# Patient Record
Sex: FEMALE | Race: BLACK OR AFRICAN AMERICAN | NOT HISPANIC OR LATINO | Employment: FULL TIME | URBAN - METROPOLITAN AREA
[De-identification: names, ages, dates, MRNs, and addresses within clinical notes are randomized per-mention and may not be internally consistent; named-entity substitution may affect disease eponyms.]

---

## 2023-08-30 ENCOUNTER — APPOINTMENT (OUTPATIENT)
Dept: PRIMARY CARE | Facility: CLINIC | Age: 33
End: 2023-08-30
Payer: COMMERCIAL

## 2023-09-17 PROBLEM — M54.50 LOW BACK PAIN: Status: ACTIVE | Noted: 2023-09-17

## 2023-09-17 PROBLEM — M25.552 HIP PAIN, LEFT: Status: ACTIVE | Noted: 2023-09-17

## 2023-09-17 PROBLEM — A74.9 CHLAMYDIA INFECTION, CURRENT PREGNANCY (HHS-HCC): Status: ACTIVE | Noted: 2023-09-17

## 2023-09-17 PROBLEM — O99.019 ANEMIA IN PREGNANCY (HHS-HCC): Status: ACTIVE | Noted: 2023-09-17

## 2023-09-17 PROBLEM — O98.819 CHLAMYDIA INFECTION, CURRENT PREGNANCY (HHS-HCC): Status: ACTIVE | Noted: 2023-09-17

## 2023-09-17 RX ORDER — METRONIDAZOLE 500 MG/1
500 TABLET ORAL
COMMUNITY
Start: 2014-12-17

## 2023-09-17 RX ORDER — VITAMIN A ACETATE, BETA CAROTENE, ASCORBIC ACID, CHOLECALCIFEROL, .ALPHA.-TOCOPHEROL ACETATE, DL-, THIAMINE MONONITRATE, RIBOFLAVIN, NIACINAMIDE, PYRIDOXINE HYDROCHLORIDE, FOLIC ACID, CYANOCOBALAMIN, CALCIUM CARBONATE, FERROUS FUMARATE, ZINC OXIDE, CUPRIC OXIDE 3080; 12; 120; 400; 1; 1.84; 3; 20; 22; 920; 25; 200; 27; 10; 2 [IU]/1; UG/1; MG/1; [IU]/1; MG/1; MG/1; MG/1; MG/1; MG/1; [IU]/1; MG/1; MG/1; MG/1; MG/1; MG/1
1 TABLET, FILM COATED ORAL DAILY
COMMUNITY
Start: 2014-09-09

## 2023-09-17 RX ORDER — FERROUS SULFATE 325(65) MG
1 TABLET ORAL
COMMUNITY
Start: 2015-01-04

## 2023-10-18 ENCOUNTER — APPOINTMENT (OUTPATIENT)
Dept: SLEEP MEDICINE | Facility: CLINIC | Age: 33
End: 2023-10-18
Payer: COMMERCIAL

## 2023-10-31 NOTE — PROGRESS NOTES
"   Patient: Alan Hilario    [id number]  : 1990 -- AGE 32 y.o.    Provider: Niyah Mclaughlin MD PhD     Location [facility]   Service Date: 10/30/2023              [facility]  New Visit Note      HISTORY OF PRESENT ILLNESS     The patient's referring provider is: Rosie Ashby MD     REASON FOR VISIT: No chief complaint on file.    HISTORY OF PRESENT ILLNESS   Ms. Rafy Hilario is a 32 y.o. female with hx of anemia In pregnancy, HTN, PCOS, anxiety who presents to a [facility] for a sleep medicine evaluation with concerns of snoring.      CURRENT HISTORY  On today's visit, the patient reports ***    Over the past five years, the patient's weight has  {Increased/decreased/unchanged:25190} by *** lbs.     Sleep schedule:  In bed: ***  Activities in bed:   {Bedtime Activities:91208}  Subjective sleep latency:  ***  Awakenings during night: ***  Length of awakenings: ***  Final awakening time: 7***  Out of bed: ***  Overall estimate of total sleep time: ***    Weekends: ***  Preferred sleeping position: {SLEEP POSITION:12297}  Typically sleeps {Blank single:59252::\"alone\",\"with a partner\",\"with her \",\"with his wife\"}.       Associated sleep symptoms:  Breathing during sleep: {Breathing during sleep:57511}  Behaviors at night: {Yes/No:91924}  Sleep paralysis: {Yes/No:95146}  Hypnogogic or hypnopompic hallucinations: {Yes/No:45186}  Cataplexy: {Yes/No:83696}    Sleep Initiation:{Sleep initiation:52943}  Problems going to sleep associated with: {Prob Falling Asleep:36535}    Sleep Maintenance: {Sleep Maintenance:84160}  Problems staying asleep associated with: {Problems Staying Asleep:43188}    Leg symptoms and timing:  {RLSSCREEN:17903}    Sleep Review of Systems: She denied symptoms that suggest narcolepsy (e.g., hypnogogic or hypnopompic hallucinations, cataplexy, or sleep paralysis) or restless legs syndrome (i.e.., " "paresthesia like sensations at rest that are relieved with movement, and circadian variation to these symptoms). She also denied parasomnia like behaviors such as somnambulism (sleep walking), sleep talking, acting out of dreams, nightmares, and teeth grinding.      Sleep environment:  Pets in bed :   {YES (DEF)/NO:63098}   Bedroom temperature: {BEDROOM TEMP:74776}  Noise :   {YES (DEF)/NO:59294}   Issues with bed comfort :   {YES (DEF)/NO:90626}       Daytime Symptoms:  On awakening patient reports: {Morning Symptoms:86047}    Daytime:       Driving History:     ESS: No data recorded   TWAN: ***  FOSQ:  ***      REVIEW OF SYSTEMS     REVIEW OF SYSTEMS  Review of Systems  {SLEEP ROS:48492:::1}      ALLERGIES AND MEDICATIONS     ALLERGIES  No Known Allergies    MEDICATIONS  Current Outpatient Medications   Medication Sig Dispense Refill    ferrous sulfate 325 (65 Fe) MG tablet Take 1 tablet (325 mg) by mouth 2 times a day with meals.      metroNIDAZOLE (Flagyl) 500 mg tablet Take 1 tablet (500 mg) by mouth.      prenatal vitamin calcium-iron-folic (Prenatal Plus, calcium carb,) 27 mg iron- 1 mg tablet Take 1 tablet by mouth once daily.       No current facility-administered medications for this visit.         PAST HISTORY     PAST MEDICAL HISTORY  She  has no past medical history on file.  ***     PAST SURGICAL HISTORY:  No past surgical history on file.    FAMILY HISTORY  No family history on file.  {{DOES/DOES NOT EC:11030} have a family history of sleep disorder (e.g., sleep apnea, narcolepsy in any first degree relatives).      SOCIAL HISTORY  She  has no history on file for tobacco use, alcohol use, and drug use. She currently lives {Leeann single:19197::\"alone\",\"with a partner\",\"with her \",\"with his wife\"}.   Patient {SOCIAL HISTORY (Optional):89377}    Work history: The patient currently {Leeann single:19197::\"works full time as a ***\",\"works part time as a ***\",\"is not working.\",\"has been retired for *** " "years but previously worked as ***\",\" is on disability due to *** but used to work as ***\"}. The patient goes to work from *** {Time; am/pm:11530} to *** {Time; am/pm:28715}.     Caffeine consumption: {yes/no:57316}  Alcohol consumption: {yes/no:30395}  Smoking: {yes/no:94205}  Marijuana: {yes/no:86544}      PHYSICAL EXAM     Physical Examination: There were no vitals taken for this visit.    PREVIOUS WEIGHTS:  Wt Readings from Last 3 Encounters:   No data found for Wt       General: The patient is a pleasant female, in no acute distress. HEENT: She has a  a modified Mallampati grade {1-4:11422} airway with {Numbers; 0-4 (with +):00038:::1} tonsils bilaterally. The soft palate was *** and the uvula was ***. The A/P diameter of the velopharynx {WAS/WAS NOT:16670} narrowed. The oropharynx {WAS/WAS NOT:81926} shallow in the A/P diameter. Lateral wall narrowing {WAS/WAS NOT:22596} present. Tongue ridging {WAS/WAS NOT:51532}present. Erythema of the posterior pharynx {WAS/WAS NOT:37552} present. Mucosal hypertrophy in the posterior oropharynx {WAS/WAS NOT:61072} present. Retrognathia {WAS/WAS NOT:76159} and micrognathia {WAS/WAS NOT:49856} present. Neck: The neck was not enlarged. No JVP, bruits or lymphadenopathy was appreciated. Chest: Clear to auscultation. No wheezes, rales, or rhonchi. Cardiovascular: Regular rate and rhythm. No murmurs, gallops, or clicks. Abdomen: Soft, nontender, nondistended. Positive bowel sounds. Extremities: No clubbing, cyanosis, or edema is noted. Neurologic exam: Alert, oriented x3 and was grossly non-focal.    RESULTS/DATA     No results found for: \"IRON\", \"TRANSFERRIN\", \"IRONSAT\", \"TIBC\", \"FERRITIN\"    Bicarbonate (mmol/L)   Date Value   03/26/2022 25       [unfilled]'s note from *** was reviewed.        DIAGNOSES     Problem List and Orders  {Assess/PlanSmartLinks:00552}      ASSESSMENT/PLAN     Ms. Hilario is a 32 y.o. female and  has no past medical history on file. She presents to "  the [facility] to address ***.      Follow up: ***         Niyah Mclaughlin MD PhD

## 2023-11-01 ENCOUNTER — APPOINTMENT (OUTPATIENT)
Dept: SLEEP MEDICINE | Facility: CLINIC | Age: 33
End: 2023-11-01
Payer: COMMERCIAL

## 2024-12-20 ENCOUNTER — APPOINTMENT (OUTPATIENT)
Dept: RADIOLOGY | Facility: HOSPITAL | Age: 34
End: 2024-12-20
Payer: COMMERCIAL

## 2024-12-20 ENCOUNTER — HOSPITAL ENCOUNTER (EMERGENCY)
Facility: HOSPITAL | Age: 34
Discharge: HOME | End: 2024-12-20
Payer: COMMERCIAL

## 2024-12-20 VITALS
RESPIRATION RATE: 16 BRPM | HEART RATE: 84 BPM | BODY MASS INDEX: 26.05 KG/M2 | SYSTOLIC BLOOD PRESSURE: 120 MMHG | OXYGEN SATURATION: 100 % | DIASTOLIC BLOOD PRESSURE: 88 MMHG | TEMPERATURE: 97.7 F | WEIGHT: 182 LBS | HEIGHT: 70 IN

## 2024-12-20 DIAGNOSIS — M54.32 BILATERAL SCIATICA: ICD-10-CM

## 2024-12-20 DIAGNOSIS — M54.31 BILATERAL SCIATICA: ICD-10-CM

## 2024-12-20 DIAGNOSIS — S93.401A SPRAIN OF RIGHT ANKLE, INITIAL ENCOUNTER: ICD-10-CM

## 2024-12-20 LAB — PREGNANCY TEST URINE, POC: NEGATIVE

## 2024-12-20 PROCEDURE — 73610 X-RAY EXAM OF ANKLE: CPT | Mod: RIGHT SIDE | Performed by: STUDENT IN AN ORGANIZED HEALTH CARE EDUCATION/TRAINING PROGRAM

## 2024-12-20 PROCEDURE — 2500000004 HC RX 250 GENERAL PHARMACY W/ HCPCS (ALT 636 FOR OP/ED): Mod: SE | Performed by: PHYSICIAN ASSISTANT

## 2024-12-20 PROCEDURE — 99283 EMERGENCY DEPT VISIT LOW MDM: CPT

## 2024-12-20 PROCEDURE — 99284 EMERGENCY DEPT VISIT MOD MDM: CPT | Performed by: PHYSICIAN ASSISTANT

## 2024-12-20 PROCEDURE — 81025 URINE PREGNANCY TEST: CPT | Performed by: PHYSICIAN ASSISTANT

## 2024-12-20 PROCEDURE — 73610 X-RAY EXAM OF ANKLE: CPT | Mod: RT

## 2024-12-20 PROCEDURE — 2500000002 HC RX 250 W HCPCS SELF ADMINISTERED DRUGS (ALT 637 FOR MEDICARE OP, ALT 636 FOR OP/ED): Mod: SE | Performed by: PHYSICIAN ASSISTANT

## 2024-12-20 PROCEDURE — 2500000001 HC RX 250 WO HCPCS SELF ADMINISTERED DRUGS (ALT 637 FOR MEDICARE OP): Mod: SE | Performed by: PHYSICIAN ASSISTANT

## 2024-12-20 RX ORDER — METHOCARBAMOL 500 MG/1
500 TABLET, FILM COATED ORAL 2 TIMES DAILY
Qty: 20 TABLET | Refills: 0 | Status: SHIPPED | OUTPATIENT
Start: 2024-12-20 | End: 2024-12-30

## 2024-12-20 RX ORDER — NAPROXEN 500 MG/1
500 TABLET ORAL
Qty: 14 TABLET | Refills: 0 | Status: SHIPPED | OUTPATIENT
Start: 2024-12-20 | End: 2024-12-27

## 2024-12-20 RX ORDER — NAPROXEN 500 MG/1
500 TABLET ORAL ONCE
Status: COMPLETED | OUTPATIENT
Start: 2024-12-20 | End: 2024-12-20

## 2024-12-20 RX ORDER — METHYLPREDNISOLONE 4 MG/1
TABLET ORAL
Qty: 21 TABLET | Refills: 0 | Status: SHIPPED | OUTPATIENT
Start: 2024-12-20 | End: 2024-12-26

## 2024-12-20 RX ORDER — PREDNISONE 20 MG/1
40 TABLET ORAL ONCE
Status: COMPLETED | OUTPATIENT
Start: 2024-12-20 | End: 2024-12-20

## 2024-12-20 RX ORDER — ORPHENADRINE CITRATE 100 MG/1
100 TABLET, EXTENDED RELEASE ORAL ONCE
Status: COMPLETED | OUTPATIENT
Start: 2024-12-20 | End: 2024-12-20

## 2024-12-20 ASSESSMENT — COLUMBIA-SUICIDE SEVERITY RATING SCALE - C-SSRS
1. IN THE PAST MONTH, HAVE YOU WISHED YOU WERE DEAD OR WISHED YOU COULD GO TO SLEEP AND NOT WAKE UP?: NO
6. HAVE YOU EVER DONE ANYTHING, STARTED TO DO ANYTHING, OR PREPARED TO DO ANYTHING TO END YOUR LIFE?: NO
2. HAVE YOU ACTUALLY HAD ANY THOUGHTS OF KILLING YOURSELF?: NO

## 2024-12-20 ASSESSMENT — PAIN SCALES - GENERAL
PAINLEVEL_OUTOF10: 9
PAINLEVEL_OUTOF10: 9

## 2024-12-20 ASSESSMENT — PAIN DESCRIPTION - LOCATION: LOCATION: ANKLE

## 2024-12-20 ASSESSMENT — PAIN - FUNCTIONAL ASSESSMENT: PAIN_FUNCTIONAL_ASSESSMENT: 0-10

## 2024-12-20 ASSESSMENT — PAIN DESCRIPTION - PAIN TYPE: TYPE: ACUTE PAIN

## 2024-12-20 ASSESSMENT — PAIN DESCRIPTION - DESCRIPTORS: DESCRIPTORS: SORE

## 2024-12-20 NOTE — ED PROVIDER NOTES
HPI   Chief Complaint   Patient presents with    Ankle Pain    Sciatica       HPI: Patient is a 34-year-old female presents to the ED for right ankle pain and sciatica.  Patient states that she recently injured her right ankle at work.  States that she never got her ankle looked at and so she has been favoring the left side of the body which is flared up her sciatica.  States she has he has a history of sciatica but this has not flared up in the past couple of years. She notes numbness to the left lower extremity and weakness in the right leg. Denies any saddle anesthesia, fever, chills, loss of bowel or bladder, history of malignancy or IV drug use.   ------------------------------------------------------------------------------------------------------------------------------------------  ROS: a ten point review of systems was performed and was negative except as per HPI.  ------------------------------------------------------------------------------------------------------------------------------------------  PMH / PSH: as per HPI, otherwise reviewed   MEDS: as per HPI, otherwise reviewed in EMR  ALLERGIES: as per HPI, otherwise reviewed in EMR  SocH:  as per HPI, otherwise reviewed in EMR  FH:  as per HPI, otherwise reviewed in EMR   ------------------------------------------------------------------------------------------------------------------------------------------  Physical Exam:  VS: As documented in the triage note and EMR flowsheet from this visit was reviewed  General: Well appearing. No acute distress.   Eyes:  Extraocular movements grossly intact. No scleral icterus.   Head: Atraumatic. Normocephalic.     Neck: No meningismus. No gross masses. Full movement through range of motion  CV: Regular rhythm. No murmurs, rubs, gallops appreciated.   Resp: Clear to auscultation bilaterally. No respiratory distress.    GI: Nontender. Soft. No masses. No rebound, rigidity or guarding.   MSK: Symmetric muscle  bulk. No gross step offs or deformities.  Diffuse tenderness and swelling to the right ankle joint.  Skin: Warm, dry. No rashes  Neuro: CN II-VII intact. A&O x3. Speech fluent. Alert. Moving all extremities. Ambulates with normal gait  Psych: Appropriate mood and affect for situation  ------------------------------------------------------------------------------------------------------------------------------------------  Hospital Course / Medical Decision Making: Patient is a 34-year-old female who presents to the ED for right ankle pain and sciatica.  States that she recently injured her right ankle at work and therefore has been favoring her left side which is caused her sciatica to flareup.  On examination, patient well-appearing.  Vitals are stable. She is ambulatory without difficulty.  Tenderness and edema noted to the right ankle on examination.  Patient administered naproxen, Norflex and prednisone.  X-ray showed no acute fracture. On reassessment, patient feeling improved. Written Rx for NSAIDs, muscle relaxants and steroids. Advised on rest, ice, elevation. As a result of the work-up, the patient was discharged home in stable condition.  They were informed of the diagnosis and instructed to come back with any concerns or worsening of condition.  Agreeable to the plan as discussed above.  Patient given the opportunity to ask questions.  All of the patient's questions were answered.               Patient History   No past medical history on file.  No past surgical history on file.  No family history on file.  Social History     Tobacco Use    Smoking status: Not on file    Smokeless tobacco: Not on file   Substance Use Topics    Alcohol use: Not on file    Drug use: Not on file       Physical Exam   ED Triage Vitals [12/20/24 1009]   Temperature Heart Rate Respirations BP   36.5 °C (97.7 °F) 84 16 120/88      Pulse Ox Temp src Heart Rate Source Patient Position   100 % -- -- --      BP Location FiO2 (%)      -- --       Physical Exam      ED Course & MDM   Diagnoses as of 12/20/24 1302   Sprain of right ankle, initial encounter   Bilateral sciatica                 No data recorded                                 Medical Decision Making      Procedure  Procedures     Clarita Birmingham PA-C  12/20/24 1120       Clarita Birmingham PA-C  12/20/24 1308

## 2024-12-20 NOTE — ED TRIAGE NOTES
Pt presented with c/o right ankle, knee and hip pain, pt states that she recently injured her right ankle at work

## 2025-07-04 ENCOUNTER — OFFICE VISIT (OUTPATIENT)
Dept: URGENT CARE | Age: 35
End: 2025-07-04
Payer: COMMERCIAL

## 2025-07-04 VITALS
WEIGHT: 185 LBS | DIASTOLIC BLOOD PRESSURE: 82 MMHG | TEMPERATURE: 98.4 F | RESPIRATION RATE: 17 BRPM | HEIGHT: 70 IN | SYSTOLIC BLOOD PRESSURE: 125 MMHG | BODY MASS INDEX: 26.48 KG/M2 | OXYGEN SATURATION: 97 % | HEART RATE: 72 BPM

## 2025-07-04 DIAGNOSIS — L02.31 ABSCESS OF RIGHT BUTTOCK: Primary | ICD-10-CM

## 2025-07-04 RX ORDER — IBUPROFEN 400 MG/1
TABLET, FILM COATED ORAL
COMMUNITY
Start: 2025-04-30

## 2025-07-04 RX ORDER — AMOXICILLIN 500 MG/1
1 CAPSULE ORAL
COMMUNITY
Start: 2025-04-30

## 2025-07-04 RX ORDER — PREDNISONE 20 MG/1
20 TABLET ORAL 2 TIMES DAILY
Qty: 10 TABLET | Refills: 0 | Status: SHIPPED | OUTPATIENT
Start: 2025-07-04 | End: 2025-07-09

## 2025-07-04 RX ORDER — DOXYCYCLINE 100 MG/1
100 CAPSULE ORAL 2 TIMES DAILY
Qty: 20 CAPSULE | Refills: 0 | Status: SHIPPED | OUTPATIENT
Start: 2025-07-04 | End: 2025-07-14

## 2025-07-04 RX ORDER — ACETAMINOPHEN 500 MG
TABLET ORAL
COMMUNITY
Start: 2025-04-30

## 2025-07-04 ASSESSMENT — ENCOUNTER SYMPTOMS
ENDOCRINE NEGATIVE: 1
RESPIRATORY NEGATIVE: 1
MUSCULOSKELETAL NEGATIVE: 1
CONSTITUTIONAL NEGATIVE: 1
CARDIOVASCULAR NEGATIVE: 1
EYES NEGATIVE: 1
HEMATOLOGIC/LYMPHATIC NEGATIVE: 1
ROS SKIN COMMENTS: ABSCESS RIGHT BUTTOCK
GASTROINTESTINAL NEGATIVE: 1
ALLERGIC/IMMUNOLOGIC NEGATIVE: 1
PSYCHIATRIC NEGATIVE: 1

## 2025-07-04 ASSESSMENT — PAIN SCALES - GENERAL: PAINLEVEL_OUTOF10: 8

## 2025-07-04 NOTE — PROGRESS NOTES
"Subjective   Patient ID: Alan Hilario \"Donna" is a 34 y.o. female. They present today with a chief complaint of boil on skin  (Has a  boil in between buttocks , noticed it Wednesday . Pain level 8/10 ).    History of Present Illness    History provided by:  Patient   used: No    Other  Location:  Right buttock abscess  Severity:  Moderate  Onset quality:  Sudden  Duration:  3 days  Timing:  Constant  Progression:  Worsening  Chronicity:  New      Past Medical History  Allergies as of 07/04/2025 - Reviewed 07/04/2025   Allergen Reaction Noted    Clindamycin Rash 12/20/2024    Iodine Rash 12/20/2024    Penicillins Rash 12/20/2024       Prescriptions Prior to Admission[1]     Medical History[2]    Surgical History[3]     reports that she has never smoked. She has never used smokeless tobacco. She reports that she does not currently use alcohol. She reports that she does not use drugs.    Review of Systems  Review of Systems   Constitutional: Negative.    HENT: Negative.     Eyes: Negative.    Respiratory: Negative.     Cardiovascular: Negative.    Gastrointestinal: Negative.    Endocrine: Negative.    Genitourinary: Negative.    Musculoskeletal: Negative.    Skin:         Abscess right buttock    Allergic/Immunologic: Negative.    Hematological: Negative.    Psychiatric/Behavioral: Negative.     All other systems reviewed and are negative.                                 Objective    Vitals:    07/04/25 1445   BP: 125/82   BP Location: Right arm   Patient Position: Sitting   BP Cuff Size: Adult   Pulse: 72   Resp: 17   Temp: 36.9 °C (98.4 °F)   TempSrc: Oral   SpO2: 97%   Weight: 83.9 kg (185 lb)   Height: 1.778 m (5' 10\")     Patient's last menstrual period was 06/18/2025.    Physical Exam  Vitals and nursing note reviewed.   Constitutional:       Appearance: Normal appearance. She is normal weight.   HENT:      Head: Normocephalic and atraumatic.   Cardiovascular:      Rate and Rhythm: Normal rate " and regular rhythm.      Pulses: Normal pulses.      Heart sounds: Normal heart sounds.   Pulmonary:      Effort: Pulmonary effort is normal.      Breath sounds: Normal breath sounds.   Abdominal:      General: Bowel sounds are normal.      Palpations: Abdomen is soft.   Musculoskeletal:         General: Normal range of motion.      Cervical back: Normal range of motion.   Skin:     General: Skin is warm.      Findings: Abscess and erythema present.      Comments: Abscess to right buttock intergluteal fold.  Indurated, non fluctuant.   Neurological:      General: No focal deficit present.      Mental Status: She is alert and oriented to person, place, and time. Mental status is at baseline.   Psychiatric:         Mood and Affect: Mood normal.         Behavior: Behavior normal.         Thought Content: Thought content normal.         Judgment: Judgment normal.         Procedures    Point of Care Test & Imaging Results from this visit  No results found for this visit on 07/04/25.   Imaging  No results found.    Cardiology, Vascular, and Other Imaging  No other imaging results found for the past 2 days      Diagnostic study results (if any) were reviewed by CLAUDIA Go.    Assessment/Plan   Allergies, medications, history, and pertinent labs/EKGs/Imaging reviewed by CLAUDIA Go.     Medical Decision Making  Medical Decision Making  At time of discharge patient was clinically well-appearing and HDS for outpatient management. The patient and/or family was educated regarding diagnosis, supportive care, OTC and Rx medications. The patient and/or family was given the opportunity to ask questions prior to discharge.  They verbalized understanding of my discussion of the plans for treatment, expected course, indications to return to  or seek further evaluation in ED, and the need for timely follow up as directed.   They were provided with a work/school excuse if requested.        Orders and  Diagnoses  Diagnoses and all orders for this visit:  Abscess of right buttock  -     predniSONE (Deltasone) 20 mg tablet; Take 1 tablet (20 mg) by mouth 2 times a day for 5 days.  -     doxycycline (Vibramycin) 100 mg capsule; Take 1 capsule (100 mg) by mouth 2 times a day for 10 days. Take with at least 8 ounces (large glass) of water, do not lie down for 30 minutes after      Return to Urgent care if symptoms return or progress  Follow up with PCP in 1-2 weeks       Patient disposition: Home    Electronically signed by CLAUDIA Go  3:29 PM           [1] (Not in a hospital admission)  [2] No past medical history on file.  [3] No past surgical history on file.

## 2025-09-04 ENCOUNTER — OFFICE VISIT (OUTPATIENT)
Dept: URGENT CARE | Age: 35
End: 2025-09-04
Payer: COMMERCIAL

## 2025-09-04 VITALS
RESPIRATION RATE: 16 BRPM | SYSTOLIC BLOOD PRESSURE: 113 MMHG | OXYGEN SATURATION: 97 % | HEART RATE: 80 BPM | DIASTOLIC BLOOD PRESSURE: 74 MMHG

## 2025-09-04 DIAGNOSIS — M54.32 SCIATICA, LEFT SIDE: Primary | ICD-10-CM

## 2025-09-04 PROCEDURE — 1036F TOBACCO NON-USER: CPT

## 2025-09-04 PROCEDURE — 99214 OFFICE O/P EST MOD 30 MIN: CPT

## 2025-09-04 RX ORDER — CYCLOBENZAPRINE HCL 10 MG
10 TABLET ORAL 2 TIMES DAILY
Qty: 14 TABLET | Refills: 0 | Status: SHIPPED | OUTPATIENT
Start: 2025-09-04 | End: 2025-09-11

## 2025-09-04 RX ORDER — PREDNISONE 20 MG/1
20 TABLET ORAL DAILY
Qty: 5 TABLET | Refills: 0 | Status: SHIPPED | OUTPATIENT
Start: 2025-09-04 | End: 2025-09-09

## 2025-09-04 RX ORDER — BUPROPION HYDROCHLORIDE 150 MG/1
150 TABLET ORAL
COMMUNITY
Start: 2020-06-30 | End: 2025-12-31

## 2025-09-04 ASSESSMENT — ENCOUNTER SYMPTOMS: BACK PAIN: 1

## 2025-09-04 ASSESSMENT — PAIN SCALES - GENERAL: PAINLEVEL_OUTOF10: 8
